# Patient Record
Sex: FEMALE | Race: WHITE | HISPANIC OR LATINO | Employment: FULL TIME | URBAN - METROPOLITAN AREA
[De-identification: names, ages, dates, MRNs, and addresses within clinical notes are randomized per-mention and may not be internally consistent; named-entity substitution may affect disease eponyms.]

---

## 2024-07-13 ENCOUNTER — OFFICE VISIT (OUTPATIENT)
Dept: URGENT CARE | Facility: CLINIC | Age: 34
End: 2024-07-13
Payer: COMMERCIAL

## 2024-07-13 VITALS
RESPIRATION RATE: 16 BRPM | WEIGHT: 183.4 LBS | OXYGEN SATURATION: 100 % | BODY MASS INDEX: 29.47 KG/M2 | SYSTOLIC BLOOD PRESSURE: 102 MMHG | HEART RATE: 86 BPM | DIASTOLIC BLOOD PRESSURE: 68 MMHG | HEIGHT: 66 IN | TEMPERATURE: 96.5 F

## 2024-07-13 DIAGNOSIS — H00.014 HORDEOLUM EXTERNUM OF LEFT UPPER EYELID: ICD-10-CM

## 2024-07-13 PROCEDURE — 99213 OFFICE O/P EST LOW 20 MIN: CPT

## 2024-07-13 PROCEDURE — 3078F DIAST BP <80 MM HG: CPT

## 2024-07-13 PROCEDURE — 3074F SYST BP LT 130 MM HG: CPT

## 2024-07-13 RX ORDER — ERYTHROMYCIN 5 MG/G
OINTMENT OPHTHALMIC
Qty: 3.5 G | Refills: 0 | Status: ON HOLD | OUTPATIENT
Start: 2024-07-13 | End: 2024-07-27

## 2024-07-13 ASSESSMENT — ENCOUNTER SYMPTOMS
EYE REDNESS: 0
FEVER: 0
COUGH: 0
EYE PAIN: 1
CHILLS: 0
BLURRED VISION: 0
EYE DISCHARGE: 0

## 2024-07-19 ENCOUNTER — HOSPITAL ENCOUNTER (EMERGENCY)
Facility: MEDICAL CENTER | Age: 34
End: 2024-07-19
Attending: OBSTETRICS & GYNECOLOGY | Admitting: OBSTETRICS & GYNECOLOGY
Payer: COMMERCIAL

## 2024-07-19 VITALS
HEART RATE: 82 BPM | SYSTOLIC BLOOD PRESSURE: 119 MMHG | HEIGHT: 66 IN | TEMPERATURE: 96.7 F | WEIGHT: 183 LBS | OXYGEN SATURATION: 94 % | BODY MASS INDEX: 29.41 KG/M2 | RESPIRATION RATE: 18 BRPM | DIASTOLIC BLOOD PRESSURE: 59 MMHG

## 2024-07-19 LAB
ALBUMIN SERPL BCP-MCNC: 3.5 G/DL (ref 3.2–4.9)
ALBUMIN/GLOB SERPL: 1.3 G/DL
ALP SERPL-CCNC: 134 U/L (ref 30–99)
ALT SERPL-CCNC: 19 U/L (ref 2–50)
ANION GAP SERPL CALC-SCNC: 15 MMOL/L (ref 7–16)
AST SERPL-CCNC: 14 U/L (ref 12–45)
BASOPHILS # BLD AUTO: 0.2 % (ref 0–1.8)
BASOPHILS # BLD: 0.02 K/UL (ref 0–0.12)
BILIRUB SERPL-MCNC: 0.3 MG/DL (ref 0.1–1.5)
BUN SERPL-MCNC: 6 MG/DL (ref 8–22)
CALCIUM ALBUM COR SERPL-MCNC: 10 MG/DL (ref 8.5–10.5)
CALCIUM SERPL-MCNC: 9.6 MG/DL (ref 8.5–10.5)
CHLORIDE SERPL-SCNC: 105 MMOL/L (ref 96–112)
CO2 SERPL-SCNC: 18 MMOL/L (ref 20–33)
CREAT SERPL-MCNC: 0.37 MG/DL (ref 0.5–1.4)
CREAT UR-MCNC: 103.9 MG/DL
EOSINOPHIL # BLD AUTO: 0.07 K/UL (ref 0–0.51)
EOSINOPHIL NFR BLD: 0.7 % (ref 0–6.9)
ERYTHROCYTE [DISTWIDTH] IN BLOOD BY AUTOMATED COUNT: 42.4 FL (ref 35.9–50)
GFR SERPLBLD CREATININE-BSD FMLA CKD-EPI: 136 ML/MIN/1.73 M 2
GLOBULIN SER CALC-MCNC: 2.7 G/DL (ref 1.9–3.5)
GLUCOSE SERPL-MCNC: 107 MG/DL (ref 65–99)
HCT VFR BLD AUTO: 39.4 % (ref 37–47)
HGB BLD-MCNC: 14.3 G/DL (ref 12–16)
IMM GRANULOCYTES # BLD AUTO: 0.05 K/UL (ref 0–0.11)
IMM GRANULOCYTES NFR BLD AUTO: 0.5 % (ref 0–0.9)
LYMPHOCYTES # BLD AUTO: 1.39 K/UL (ref 1–4.8)
LYMPHOCYTES NFR BLD: 14.1 % (ref 22–41)
MCH RBC QN AUTO: 33.6 PG (ref 27–33)
MCHC RBC AUTO-ENTMCNC: 36.3 G/DL (ref 32.2–35.5)
MCV RBC AUTO: 92.5 FL (ref 81.4–97.8)
MONOCYTES # BLD AUTO: 0.54 K/UL (ref 0–0.85)
MONOCYTES NFR BLD AUTO: 5.5 % (ref 0–13.4)
NEUTROPHILS # BLD AUTO: 7.79 K/UL (ref 1.82–7.42)
NEUTROPHILS NFR BLD: 79 % (ref 44–72)
NRBC # BLD AUTO: 0 K/UL
NRBC BLD-RTO: 0 /100 WBC (ref 0–0.2)
PLATELET # BLD AUTO: 170 K/UL (ref 164–446)
PMV BLD AUTO: 12.8 FL (ref 9–12.9)
POTASSIUM SERPL-SCNC: 3.8 MMOL/L (ref 3.6–5.5)
PROT SERPL-MCNC: 6.2 G/DL (ref 6–8.2)
PROT UR-MCNC: 12 MG/DL (ref 0–15)
PROT/CREAT UR: 115 MG/G (ref 10–107)
RBC # BLD AUTO: 4.26 M/UL (ref 4.2–5.4)
SODIUM SERPL-SCNC: 138 MMOL/L (ref 135–145)
URATE SERPL-MCNC: 4.3 MG/DL (ref 1.9–8.2)
WBC # BLD AUTO: 9.9 K/UL (ref 4.8–10.8)

## 2024-07-19 PROCEDURE — 36415 COLL VENOUS BLD VENIPUNCTURE: CPT

## 2024-07-19 PROCEDURE — 99283 EMERGENCY DEPT VISIT LOW MDM: CPT

## 2024-07-19 PROCEDURE — 82570 ASSAY OF URINE CREATININE: CPT

## 2024-07-19 PROCEDURE — 85025 COMPLETE CBC W/AUTO DIFF WBC: CPT

## 2024-07-19 PROCEDURE — 84156 ASSAY OF PROTEIN URINE: CPT

## 2024-07-19 PROCEDURE — 84550 ASSAY OF BLOOD/URIC ACID: CPT

## 2024-07-19 PROCEDURE — 80053 COMPREHEN METABOLIC PANEL: CPT

## 2024-07-19 PROCEDURE — 59025 FETAL NON-STRESS TEST: CPT

## 2024-07-25 ENCOUNTER — HOSPITAL ENCOUNTER (INPATIENT)
Facility: MEDICAL CENTER | Age: 34
LOS: 2 days | End: 2024-07-27
Attending: OBSTETRICS & GYNECOLOGY | Admitting: OBSTETRICS & GYNECOLOGY
Payer: COMMERCIAL

## 2024-07-25 DIAGNOSIS — R10.2 POSTPARTUM PERINEAL PAIN: ICD-10-CM

## 2024-07-25 LAB — HOLDING TUBE BB 8507: NORMAL

## 2024-07-25 PROCEDURE — 302449 STATCHG TRIAGE ONLY (STATISTIC)

## 2024-07-25 PROCEDURE — 85025 COMPLETE CBC W/AUTO DIFF WBC: CPT

## 2024-07-25 PROCEDURE — 36415 COLL VENOUS BLD VENIPUNCTURE: CPT

## 2024-07-25 PROCEDURE — 700111 HCHG RX REV CODE 636 W/ 250 OVERRIDE (IP): Performed by: OBSTETRICS & GYNECOLOGY

## 2024-07-25 PROCEDURE — 770002 HCHG ROOM/CARE - OB PRIVATE (112)

## 2024-07-25 PROCEDURE — 700105 HCHG RX REV CODE 258: Performed by: OBSTETRICS & GYNECOLOGY

## 2024-07-25 PROCEDURE — 86780 TREPONEMA PALLIDUM: CPT

## 2024-07-25 RX ORDER — LIDOCAINE HYDROCHLORIDE 10 MG/ML
20 INJECTION, SOLUTION INFILTRATION; PERINEURAL
Status: DISCONTINUED | OUTPATIENT
Start: 2024-07-25 | End: 2024-07-26

## 2024-07-25 RX ORDER — OXYTOCIN 10 [USP'U]/ML
10 INJECTION, SOLUTION INTRAMUSCULAR; INTRAVENOUS
Status: DISCONTINUED | OUTPATIENT
Start: 2024-07-25 | End: 2024-07-26

## 2024-07-25 RX ORDER — SODIUM CHLORIDE, SODIUM LACTATE, POTASSIUM CHLORIDE, CALCIUM CHLORIDE 600; 310; 30; 20 MG/100ML; MG/100ML; MG/100ML; MG/100ML
INJECTION, SOLUTION INTRAVENOUS CONTINUOUS
Status: DISCONTINUED | OUTPATIENT
Start: 2024-07-25 | End: 2024-07-26

## 2024-07-25 RX ORDER — ACETAMINOPHEN 500 MG
1000 TABLET ORAL
Status: COMPLETED | OUTPATIENT
Start: 2024-07-25 | End: 2024-07-26

## 2024-07-25 RX ORDER — TERBUTALINE SULFATE 1 MG/ML
0.25 INJECTION, SOLUTION SUBCUTANEOUS
Status: DISCONTINUED | OUTPATIENT
Start: 2024-07-25 | End: 2024-07-26

## 2024-07-25 RX ORDER — IBUPROFEN 800 MG/1
800 TABLET, FILM COATED ORAL
Status: COMPLETED | OUTPATIENT
Start: 2024-07-25 | End: 2024-07-26

## 2024-07-25 RX ADMIN — SODIUM CHLORIDE, POTASSIUM CHLORIDE, SODIUM LACTATE AND CALCIUM CHLORIDE: 600; 310; 30; 20 INJECTION, SOLUTION INTRAVENOUS at 23:11

## 2024-07-25 RX ADMIN — OXYTOCIN 2 MILLI-UNITS/MIN: 10 INJECTION INTRAVENOUS at 23:10

## 2024-07-25 ASSESSMENT — LIFESTYLE VARIABLES
ALCOHOL_USE: NO
EVER_SMOKED: NEVER

## 2024-07-25 ASSESSMENT — PATIENT HEALTH QUESTIONNAIRE - PHQ9
SUM OF ALL RESPONSES TO PHQ9 QUESTIONS 1 AND 2: 0
1. LITTLE INTEREST OR PLEASURE IN DOING THINGS: NOT AT ALL
2. FEELING DOWN, DEPRESSED, IRRITABLE, OR HOPELESS: NOT AT ALL

## 2024-07-25 ASSESSMENT — FIBROSIS 4 INDEX: FIB4 SCORE: 0.62

## 2024-07-25 ASSESSMENT — PAIN DESCRIPTION - PAIN TYPE
TYPE: ACUTE PAIN

## 2024-07-26 ENCOUNTER — ANESTHESIA (OUTPATIENT)
Dept: ANESTHESIOLOGY | Facility: MEDICAL CENTER | Age: 34
End: 2024-07-26
Payer: COMMERCIAL

## 2024-07-26 ENCOUNTER — ANESTHESIA EVENT (OUTPATIENT)
Dept: ANESTHESIOLOGY | Facility: MEDICAL CENTER | Age: 34
End: 2024-07-26
Payer: COMMERCIAL

## 2024-07-26 LAB
BASOPHILS # BLD AUTO: 0.3 % (ref 0–1.8)
BASOPHILS # BLD: 0.04 K/UL (ref 0–0.12)
EOSINOPHIL # BLD AUTO: 0.11 K/UL (ref 0–0.51)
EOSINOPHIL NFR BLD: 0.9 % (ref 0–6.9)
ERYTHROCYTE [DISTWIDTH] IN BLOOD BY AUTOMATED COUNT: 43.4 FL (ref 35.9–50)
HCT VFR BLD AUTO: 39.8 % (ref 37–47)
HGB BLD-MCNC: 13.9 G/DL (ref 12–16)
IMM GRANULOCYTES # BLD AUTO: 0.08 K/UL (ref 0–0.11)
IMM GRANULOCYTES NFR BLD AUTO: 0.7 % (ref 0–0.9)
LYMPHOCYTES # BLD AUTO: 2.16 K/UL (ref 1–4.8)
LYMPHOCYTES NFR BLD: 17.7 % (ref 22–41)
MCH RBC QN AUTO: 32.7 PG (ref 27–33)
MCHC RBC AUTO-ENTMCNC: 34.9 G/DL (ref 32.2–35.5)
MCV RBC AUTO: 93.6 FL (ref 81.4–97.8)
MONOCYTES # BLD AUTO: 0.84 K/UL (ref 0–0.85)
MONOCYTES NFR BLD AUTO: 6.9 % (ref 0–13.4)
NEUTROPHILS # BLD AUTO: 8.96 K/UL (ref 1.82–7.42)
NEUTROPHILS NFR BLD: 73.5 % (ref 44–72)
NRBC # BLD AUTO: 0 K/UL
NRBC BLD-RTO: 0 /100 WBC (ref 0–0.2)
PLATELET # BLD AUTO: 173 K/UL (ref 164–446)
PMV BLD AUTO: 13.3 FL (ref 9–12.9)
RBC # BLD AUTO: 4.25 M/UL (ref 4.2–5.4)
T PALLIDUM AB SER QL IA: NORMAL
WBC # BLD AUTO: 12.2 K/UL (ref 4.8–10.8)

## 2024-07-26 PROCEDURE — 10907ZC DRAINAGE OF AMNIOTIC FLUID, THERAPEUTIC FROM PRODUCTS OF CONCEPTION, VIA NATURAL OR ARTIFICIAL OPENING: ICD-10-PCS | Performed by: OBSTETRICS & GYNECOLOGY

## 2024-07-26 PROCEDURE — 3E033VJ INTRODUCTION OF OTHER HORMONE INTO PERIPHERAL VEIN, PERCUTANEOUS APPROACH: ICD-10-PCS | Performed by: OBSTETRICS & GYNECOLOGY

## 2024-07-26 PROCEDURE — 700101 HCHG RX REV CODE 250: Performed by: OBSTETRICS & GYNECOLOGY

## 2024-07-26 PROCEDURE — 700111 HCHG RX REV CODE 636 W/ 250 OVERRIDE (IP): Performed by: OBSTETRICS & GYNECOLOGY

## 2024-07-26 PROCEDURE — 700102 HCHG RX REV CODE 250 W/ 637 OVERRIDE(OP): Performed by: OBSTETRICS & GYNECOLOGY

## 2024-07-26 PROCEDURE — 303615 HCHG EPIDURAL/SPINAL ANESTHESIA FOR LABOR

## 2024-07-26 PROCEDURE — A9270 NON-COVERED ITEM OR SERVICE: HCPCS | Performed by: OBSTETRICS & GYNECOLOGY

## 2024-07-26 PROCEDURE — 770002 HCHG ROOM/CARE - OB PRIVATE (112)

## 2024-07-26 PROCEDURE — 700105 HCHG RX REV CODE 258: Performed by: OBSTETRICS & GYNECOLOGY

## 2024-07-26 PROCEDURE — 304965 HCHG RECOVERY SERVICES

## 2024-07-26 PROCEDURE — 59409 OBSTETRICAL CARE: CPT

## 2024-07-26 PROCEDURE — 0KQM0ZZ REPAIR PERINEUM MUSCLE, OPEN APPROACH: ICD-10-PCS | Performed by: OBSTETRICS & GYNECOLOGY

## 2024-07-26 PROCEDURE — 700111 HCHG RX REV CODE 636 W/ 250 OVERRIDE (IP): Mod: JZ

## 2024-07-26 RX ORDER — VITAMIN A ACETATE, BETA CAROTENE, ASCORBIC ACID, CHOLECALCIFEROL, .ALPHA.-TOCOPHEROL ACETATE, DL-, THIAMINE MONONITRATE, RIBOFLAVIN, NIACINAMIDE, PYRIDOXINE HYDROCHLORIDE, FOLIC ACID, CYANOCOBALAMIN, CALCIUM CARBONATE, FERROUS FUMARATE, ZINC OXIDE, CUPRIC OXIDE 3080; 12; 120; 400; 1; 1.84; 3; 20; 22; 920; 25; 200; 27; 10; 2 [IU]/1; UG/1; MG/1; [IU]/1; MG/1; MG/1; MG/1; MG/1; MG/1; [IU]/1; MG/1; MG/1; MG/1; MG/1; MG/1
1 TABLET, FILM COATED ORAL
Status: DISCONTINUED | OUTPATIENT
Start: 2024-07-26 | End: 2024-07-27 | Stop reason: HOSPADM

## 2024-07-26 RX ORDER — SIMETHICONE 125 MG
125 TABLET,CHEWABLE ORAL 4 TIMES DAILY PRN
Status: DISCONTINUED | OUTPATIENT
Start: 2024-07-26 | End: 2024-07-27 | Stop reason: HOSPADM

## 2024-07-26 RX ORDER — DOCUSATE SODIUM 100 MG/1
100 CAPSULE, LIQUID FILLED ORAL 2 TIMES DAILY PRN
Status: DISCONTINUED | OUTPATIENT
Start: 2024-07-26 | End: 2024-07-27 | Stop reason: HOSPADM

## 2024-07-26 RX ORDER — CALCIUM CARBONATE 500 MG/1
1000 TABLET, CHEWABLE ORAL EVERY 6 HOURS PRN
Status: DISCONTINUED | OUTPATIENT
Start: 2024-07-26 | End: 2024-07-27 | Stop reason: HOSPADM

## 2024-07-26 RX ORDER — SODIUM CHLORIDE, SODIUM LACTATE, POTASSIUM CHLORIDE, AND CALCIUM CHLORIDE .6; .31; .03; .02 G/100ML; G/100ML; G/100ML; G/100ML
1000 INJECTION, SOLUTION INTRAVENOUS
Status: DISCONTINUED | OUTPATIENT
Start: 2024-07-26 | End: 2024-07-26 | Stop reason: HOSPADM

## 2024-07-26 RX ORDER — ROPIVACAINE HYDROCHLORIDE 2 MG/ML
INJECTION, SOLUTION EPIDURAL; INFILTRATION; PERINEURAL
Status: COMPLETED
Start: 2024-07-26 | End: 2024-07-26

## 2024-07-26 RX ORDER — IBUPROFEN 800 MG/1
800 TABLET, FILM COATED ORAL EVERY 8 HOURS PRN
Status: DISCONTINUED | OUTPATIENT
Start: 2024-07-26 | End: 2024-07-27 | Stop reason: HOSPADM

## 2024-07-26 RX ORDER — ROPIVACAINE HYDROCHLORIDE 2 MG/ML
INJECTION, SOLUTION EPIDURAL; INFILTRATION; PERINEURAL CONTINUOUS
Status: DISCONTINUED | OUTPATIENT
Start: 2024-07-26 | End: 2024-07-26

## 2024-07-26 RX ORDER — LORATADINE 10 MG/1
10 TABLET ORAL ONCE
Status: COMPLETED | OUTPATIENT
Start: 2024-07-26 | End: 2024-07-26

## 2024-07-26 RX ORDER — MISOPROSTOL 200 UG/1
600 TABLET ORAL
Status: DISCONTINUED | OUTPATIENT
Start: 2024-07-26 | End: 2024-07-27 | Stop reason: HOSPADM

## 2024-07-26 RX ORDER — EPHEDRINE SULFATE 50 MG/ML
5 INJECTION, SOLUTION INTRAVENOUS
Status: DISCONTINUED | OUTPATIENT
Start: 2024-07-26 | End: 2024-07-26 | Stop reason: HOSPADM

## 2024-07-26 RX ORDER — ACETAMINOPHEN 500 MG
1000 TABLET ORAL EVERY 6 HOURS PRN
Status: DISCONTINUED | OUTPATIENT
Start: 2024-07-26 | End: 2024-07-27 | Stop reason: HOSPADM

## 2024-07-26 RX ORDER — OXYCODONE HYDROCHLORIDE 5 MG/1
5 TABLET ORAL EVERY 4 HOURS PRN
Status: DISCONTINUED | OUTPATIENT
Start: 2024-07-26 | End: 2024-07-27 | Stop reason: HOSPADM

## 2024-07-26 RX ORDER — SODIUM CHLORIDE, SODIUM LACTATE, POTASSIUM CHLORIDE, AND CALCIUM CHLORIDE .6; .31; .03; .02 G/100ML; G/100ML; G/100ML; G/100ML
250 INJECTION, SOLUTION INTRAVENOUS PRN
Status: DISCONTINUED | OUTPATIENT
Start: 2024-07-26 | End: 2024-07-26 | Stop reason: HOSPADM

## 2024-07-26 RX ORDER — SODIUM CHLORIDE, SODIUM LACTATE, POTASSIUM CHLORIDE, CALCIUM CHLORIDE 600; 310; 30; 20 MG/100ML; MG/100ML; MG/100ML; MG/100ML
INJECTION, SOLUTION INTRAVENOUS PRN
Status: DISCONTINUED | OUTPATIENT
Start: 2024-07-26 | End: 2024-07-27 | Stop reason: HOSPADM

## 2024-07-26 RX ADMIN — ROPIVACAINE HYDROCHLORIDE 200 MG: 2 INJECTION, SOLUTION EPIDURAL; INFILTRATION at 02:29

## 2024-07-26 RX ADMIN — OXYCODONE 5 MG: 5 TABLET ORAL at 14:45

## 2024-07-26 RX ADMIN — OXYTOCIN 20 UNITS: 10 INJECTION INTRAVENOUS at 11:15

## 2024-07-26 RX ADMIN — LIDOCAINE HYDROCHLORIDE 20 ML: 10 INJECTION, SOLUTION INFILTRATION; PERINEURAL at 10:55

## 2024-07-26 RX ADMIN — ACETAMINOPHEN 1000 MG: 500 TABLET ORAL at 11:51

## 2024-07-26 RX ADMIN — ACETAMINOPHEN 1000 MG: 500 TABLET ORAL at 18:22

## 2024-07-26 RX ADMIN — LORATADINE 10 MG: 10 TABLET ORAL at 02:51

## 2024-07-26 RX ADMIN — IBUPROFEN 800 MG: 800 TABLET, FILM COATED ORAL at 11:51

## 2024-07-26 RX ADMIN — OXYTOCIN 125 ML/HR: 10 INJECTION INTRAVENOUS at 12:32

## 2024-07-26 RX ADMIN — OXYCODONE 5 MG: 5 TABLET ORAL at 21:58

## 2024-07-26 RX ADMIN — ROPIVACAINE HYDROCHLORIDE 200 MG: 2 INJECTION, SOLUTION EPIDURAL; INFILTRATION; PERINEURAL at 02:29

## 2024-07-26 ASSESSMENT — PAIN SCALES - GENERAL: PAIN_LEVEL: 0

## 2024-07-26 ASSESSMENT — PAIN DESCRIPTION - PAIN TYPE
TYPE: ACUTE PAIN

## 2024-07-26 NOTE — PROGRESS NOTES
Patient presenting to labor and delivery. Admission profile completed. Patient denies VB, LOF, HA, vision changes, pain. Patient reports active fetal movement and occasional mild contractions. Patient reports a history of elevated blood pressures. Discussed plan of care with patient and significant other. IV started and labs collected. Oriented patient to unit, discussed how to use bed controls and call light. Dr. Rivera at bedside to assess patient. Pitocin started per MD orders see MAR.     0212: Dr. Palmer at bedside to insert epidural catheter. Consent provided to patient and consent papers are signed. Patient positioned upright at edge of bed with assistance from RN and anesthesiologist. Timeout completed all members agree, Test dose performed and resulted as negative.    0330: Report given to Maddy BRENNER, POC discussed, care of patient relinquished at this time.

## 2024-07-26 NOTE — PROGRESS NOTES
"Labor Progress Note    Hannah Vieira   38w2d      Subjective:  Uterine contractions:yes  Pain: No    Objective:   Vitals:    07/25/24 2110 07/25/24 2120   BP: 128/79    Pulse: 75 78   Resp: 18    Temp: 36.3 °C (97.4 °F)    TempSrc: Temporal    SpO2: 96%    Weight: 83.9 kg (185 lb)    Height: 1.676 m (5' 6\")      Fetal heart variability: moderate  Fetal Heart Rate decelerations: none  Fetal Heart Rate accelerations: yes  Baseline FHR: 130 per minute  Uterine contractions: regular, every 2 minutes  Cervical: 75% ;  Dilatation .4 ; Station negative1    Membranes ruptured: .yes and arom clear    Meds:   Epidural : .no  Pitocin: .yes    Labs:  Recent Results (from the past 24 hour(s))   HOLD BLOOD BANK SPECIMEN (NOT TESTED)    Collection Time: 07/25/24  9:20 PM   Result Value Ref Range    Holding Tube - Bb DONE        Assessment:   38w2d  Labor State: Early latent labor.      Wendi Rivera M.D.            "

## 2024-07-26 NOTE — PROGRESS NOTES
0510: Report received from Maddy BRENNER. POC discussed.     0815: SVE complete. Dr Ramos notified.     0835: Began pushing.     1045: Delivery of viable female infant.     1245: Pt up to bathroom, steady gait, brian care done.     1445: Pt reporting pain in rectum, unable to sit or find a comfortable position. Visualized by this RN, small potential hemorrhoid vs hematoma noted.     1500: Dr Ramos at bedside to assess pt rectum. Plan to recheck area in one hour. Tucks and spray in place, ice packs being used.     1555: This RN at bedside to assess, less swelling noted to rectum. Pt reports improvement in pain and comfort in rectal area. Pt into wheelchair and moved to postpartum. Report to Terrie BRENNER. POC discussed.

## 2024-07-26 NOTE — PROGRESS NOTES
Labor Progress Note    Hannah Vieira   38w2d      Subjective:  Comfortable with epidural but breathing through the peak controlled.     Objective:   Vitals:    24 0359 24 0419 24 0439 24 0459   BP: 104/66 108/70 110/66 103/60   Pulse: 65 68 (!) 59 62   Resp:       Temp:       TempSrc:       SpO2:       Weight:       Height:         Fetal tracing: category 2, moderate variability, some early decels/variables present. No late appearing decelerations  SVE: complete per RN check  [place] q2 min but some lasting 2.5-3 min long        Labs:  Recent Results (from the past 24 hour(s))   HOLD BLOOD BANK SPECIMEN (NOT TESTED)    Collection Time: 24  9:20 PM   Result Value Ref Range    Holding Tube - Bb DONE    CBC with differential    Collection Time: 24  9:20 PM   Result Value Ref Range    WBC 12.2 (H) 4.8 - 10.8 K/uL    RBC 4.25 4.20 - 5.40 M/uL    Hemoglobin 13.9 12.0 - 16.0 g/dL    Hematocrit 39.8 37.0 - 47.0 %    MCV 93.6 81.4 - 97.8 fL    MCH 32.7 27.0 - 33.0 pg    MCHC 34.9 32.2 - 35.5 g/dL    RDW 43.4 35.9 - 50.0 fL    Platelet Count 173 164 - 446 K/uL    MPV 13.3 (H) 9.0 - 12.9 fL    Neutrophils-Polys 73.50 (H) 44.00 - 72.00 %    Lymphocytes 17.70 (L) 22.00 - 41.00 %    Monocytes 6.90 0.00 - 13.40 %    Eosinophils 0.90 0.00 - 6.90 %    Basophils 0.30 0.00 - 1.80 %    Immature Granulocytes 0.70 0.00 - 0.90 %    Nucleated RBC 0.00 0.00 - 0.20 /100 WBC    Neutrophils (Absolute) 8.96 (H) 1.82 - 7.42 K/uL    Lymphs (Absolute) 2.16 1.00 - 4.80 K/uL    Monos (Absolute) 0.84 0.00 - 0.85 K/uL    Eos (Absolute) 0.11 0.00 - 0.51 K/uL    Baso (Absolute) 0.04 0.00 - 0.12 K/uL    Immature Granulocytes (abs) 0.08 0.00 - 0.11 K/uL    NRBC (Absolute) 0.00 K/uL   T.PALLIDUM AB ROSEMARY (Syphilis)    Collection Time: 24  9:20 PM   Result Value Ref Range    Syphilis, Treponemal Qual Non-Reactive Non-Reactive       Assessment: plan  34 y.o. female  at 38w2d   Pt to start pushing  Fetal tracing  overall reassuring  Anticipate         Ronel Ramos M.D.

## 2024-07-26 NOTE — PROGRESS NOTES
See dictation for full note     with mild shoulder dystocia apx 10 seconds (Mostly a lack of maternal pushing dystocia)  Head of bed flattened and Phylicia and Ridkin maneuver for delivery.   Female  Vtx  Clear fluid  Tight nuchal x 1  Apgars 7/8  Weight pending at this time  Intact placenta , 3vc  2nd degree laceration repaired with 2.0 and 3.0 vicryl  Rectum and spincter intact  EBL 200cc

## 2024-07-26 NOTE — CARE PLAN
The patient is Stable - Low risk of patient condition declining or worsening    Shift Goals  Clinical Goals: cervical dilation and effacement  Patient Goals: healthy mom and healthy baby  Family Goals: support    Progress made toward(s) clinical / shift goals:    Problem: Knowledge Deficit - L&D  Goal: Patient and family/caregivers will demonstrate understanding of plan of care, disease process/condition, diagnostic tests and medications  Outcome: Progressing  Note: Oriented patient to unit, discussed bed controls and call light. Educated patient on labor and delivery process. Discussed POC with patient and support persons, patient agrees to and verbalized understanding of POC. All questions answered at this time.      Problem: Pain - Standard  Goal: Alleviation of pain or a reduction in pain to the patient’s comfort goal  Outcome: Progressing  Note: Pt states she is experiencing adequate pain control       Patient is not progressing towards the following goals:

## 2024-07-26 NOTE — L&D DELIVERY NOTE
DATE OF SERVICE:  2024     PREOPERATIVE DIAGNOSES:  1.  Intrauterine pregnancy at 38 and  weeks' gestation.  2.  Chronic hypertension with superimposed preeclampsia but currently on no   medications and not requiring magnesium sulfate therapy for seizure   prophylaxis as blood pressures are low.     POSTOPERATIVE DIAGNOSES:  1.  Intrauterine pregnancy at 38 and  weeks' gestation.  2.  Chronic hypertension with superimposed preeclampsia but currently on no   medications and not requiring magnesium sulfate therapy for seizure   prophylaxis as blood pressures are low.  3.  Mild shoulder dystocia.     PROCEDURE:  Normal spontaneous vaginal delivery with mild shoulder dystocia   approximately 10 seconds of the head on the perineum, required bed to be laid   flat, Phylicia position and Ritgen's maneuver of the fetal head as the   patient essentially stopped pushing.  The dystocia was mostly because the   patient stopped maternal expulsion efforts of pushing.     DELIVERING PHYSICIAN:  Ronel Ramos MD     ANESTHESIOLOGIST:  Kiran Palmer MD     ANESTHESIA:  Epidural.     COMPLICATIONS:  Again, about a 10-second shoulder dystocia that was alleviated   with minimal maneuvers.  The bed was laid back Phylicia position and the   fetal head was Ritgen out as maternal pushing efforts had stalled.     ESTIMATED BLOOD LOSS:  200 mL.     FINDINGS:  Female infant, cephalic presentation, clear amniotic fluid, nuchal   cord tight x1, Apgars 7 and 8, weight still pending at the time of this   dictation, second-degree midline vaginal and perineal laceration repaired with   Vicryl.  Rectum and sphincter intact.  Sponge, laps and needle counts were   correct.  Mother and baby both doing well.  Mother will go to postpartum, baby   to  nursery.  Baby is moving all extremities.     DESCRIPTION OF PROCEDURE/HOSPITAL COURSE:  The patient was admitted for   induction of labor.  A 34-year-old  at 38 and   weeks' gestation.    She was followed for gestational hypertension and declined induction of labor   and ultimately she progressed to having superimposed preeclampsia due to   elevated urine protein creatinine ratio in the office.  However, she was not   on any blood pressure medication and blood pressures were mild at most and   most of them were normal.  She was not initiated therefore on magnesium   sulfate.  She was admitted and begun on Pitocin.  She eventually had   artificial rupture of membranes and received epidural anesthesia.  She went to   complete.  She pushed and right at  she stopped pushing and therefore   there was a mild shoulder dystocia only secondary to that.  Head of the bed   was placed down and Phylicia position was assumed and Ritgen's maneuver was   used to deliver the fetal head and shoulders in less than 10 seconds.  There   was a tight nuchal cord that was reduced after delivery and the baby was then   placed on maternal abdomen with awaiting delivery team nurse.  Delay for cord   clamp, cord was then clamped x2 and cut by the patient's friend.  The placenta   delivered spontaneously intact, 3-vessel cord.  She had a second-degree   vaginal and perineal laceration that was repaired with 2-0 and 3-0 Vicryl   respectively.  Rectum and sphincter intact.  Sponge, laps and needle counts   were correct.  Mother and baby both doing well.  Baby is moving all arms and   extremities.        ______________________________  MD IRMA BOSS/ALONSO    DD:  2024 12:02  DT:  2024 12:31    Job#:  [id number]

## 2024-07-26 NOTE — H&P
History and Physical      Hannah Vieira is a 34 y.o. female  at 38w1d who presents for IOL  Patient was being followed in the office for gestational hypertension and had declined induction of labor.  Ultimately her disease process progressed to preeclampsia, she had a elevated urine protein creatinine ratio in the office, remainder of labs are normal.  She denies any headaches or spots in her vision.    Subjective:   negative  For CTXS.   negative Feels pain   negative for LOF  negative for vaginal bleeding.   positive for fetal movement    ROS: Pertinent items are noted in HPI.    History reviewed. No pertinent past medical history.  Past Surgical History:   Procedure Laterality Date    DENTAL EXTRACTION(S)      TONSILLECTOMY       History reviewed. No pertinent family history.  OB History    Para Term  AB Living   1             SAB IAB Ectopic Molar Multiple Live Births                    # Outcome Date GA Lbr Yves/2nd Weight Sex Delivery Anes PTL Lv   1 Current              Social History     Tobacco Use    Smoking status: Never    Smokeless tobacco: Never   Vaping Use    Vaping status: Never Used   Substance Use Topics    Alcohol use: Not Currently    Drug use: Not Currently     Allergies: Pcn [penicillins]    Current Facility-Administered Medications:     LR infusion, , Intravenous, Continuous, Wendi Rivera M.D., Last Rate: 125 mL/hr at 24 2311, New Bag at 24 2311    lidocaine (XYLOCAINE) 1%  injection, 20 mL, Subcutaneous, Once PRN, Wendi Rivera M.D.    terbutaline (Brethine) injection 0.25 mg, 0.25 mg, Subcutaneous, Once PRN, Wendi Rivera M.D.    oxytocin (Pitocin) infusion bolus (for post delivery), 20 Units, Intravenous, Once **FOLLOWED BY** oxytocin (Pitocin) infusion (for post delivery), 125 mL/hr, Intravenous, Continuous, Wendi Rivera M.D.    oxytocin (Pitocin) injection 10 Units, 10 Units, Intramuscular, Once PRN, Wendi Rivera M.D.     "ibuprofen (Motrin) tablet 800 mg, 800 mg, Oral, Once PRN, Wendi Rivera M.D.    acetaminophen (Tylenol) tablet 1,000 mg, 1,000 mg, Oral, Once PRN, Wendi Rivrea M.D.    oxytocin (Pitocin) infusion (for induction), 0.5-20 trent-units/min, Intravenous, Continuous, Wendi Rivera M.D., Last Rate: 6 mL/hr at 07/25/24 2310, 2 trent-units/min at 07/25/24 2310    Prenatal care with Working starting at 9 weeks with following problems:  Patient Active Problem List    Diagnosis Date Noted    Indication for care in labor or delivery 07/25/2024       Objective:      /79   Pulse 78   Temp 36.3 °C (97.4 °F) (Temporal)   Resp 18   Ht 1.676 m (5' 6\")   Wt 83.9 kg (185 lb)   SpO2 96%     General:   no acute distress   Skin:   normal   HEENT:  Neck supple with midline trachea   Lungs:   CTA bilateral   Heart:   S1, S2 normal, no murmur, click, rub or gallop, regular rate and rhythm, no hepatosplenomegaly, S1, S2 normal,\"no JVD   Abdomen:   gravid, NT   EFW:  7 lbs   Pelvis:  adequate with gynecoid pelvis   FHT:  150 BPM   Uterine Size: S=D   Presentations: Cephalic   Cervix:     Dilation: 3cm    Effacement: 50%    Station:  -2    Consistency: Medium    Position: Middle     Lab Review  Recent Results (from the past 5880 hour(s))   PROTEIN/CREAT RATIO URINE    Collection Time: 07/19/24  3:00 PM   Result Value Ref Range    Total Protein, Urine 12.0 0.0 - 15.0 mg/dL    Creatinine, Random Urine 103.90 mg/dL    Protein Creatinine Ratio 115 (H) 10 - 107 mg/g   CBC WITH DIFFERENTIAL    Collection Time: 07/19/24  3:07 PM   Result Value Ref Range    WBC 9.9 4.8 - 10.8 K/uL    RBC 4.26 4.20 - 5.40 M/uL    Hemoglobin 14.3 12.0 - 16.0 g/dL    Hematocrit 39.4 37.0 - 47.0 %    MCV 92.5 81.4 - 97.8 fL    MCH 33.6 (H) 27.0 - 33.0 pg    MCHC 36.3 (H) 32.2 - 35.5 g/dL    RDW 42.4 35.9 - 50.0 fL    Platelet Count 170 164 - 446 K/uL    MPV 12.8 9.0 - 12.9 fL    Neutrophils-Polys 79.00 (H) 44.00 - 72.00 %    Lymphocytes 14.10 (L) " 22.00 - 41.00 %    Monocytes 5.50 0.00 - 13.40 %    Eosinophils 0.70 0.00 - 6.90 %    Basophils 0.20 0.00 - 1.80 %    Immature Granulocytes 0.50 0.00 - 0.90 %    Nucleated RBC 0.00 0.00 - 0.20 /100 WBC    Neutrophils (Absolute) 7.79 (H) 1.82 - 7.42 K/uL    Lymphs (Absolute) 1.39 1.00 - 4.80 K/uL    Monos (Absolute) 0.54 0.00 - 0.85 K/uL    Eos (Absolute) 0.07 0.00 - 0.51 K/uL    Baso (Absolute) 0.02 0.00 - 0.12 K/uL    Immature Granulocytes (abs) 0.05 0.00 - 0.11 K/uL    NRBC (Absolute) 0.00 K/uL   Comp Metabolic Panel    Collection Time: 07/19/24  3:07 PM   Result Value Ref Range    Sodium 138 135 - 145 mmol/L    Potassium 3.8 3.6 - 5.5 mmol/L    Chloride 105 96 - 112 mmol/L    Co2 18 (L) 20 - 33 mmol/L    Anion Gap 15.0 7.0 - 16.0    Glucose 107 (H) 65 - 99 mg/dL    Bun 6 (L) 8 - 22 mg/dL    Creatinine 0.37 (L) 0.50 - 1.40 mg/dL    Calcium 9.6 8.5 - 10.5 mg/dL    Correct Calcium 10.0 8.5 - 10.5 mg/dL    AST(SGOT) 14 12 - 45 U/L    ALT(SGPT) 19 2 - 50 U/L    Alkaline Phosphatase 134 (H) 30 - 99 U/L    Total Bilirubin 0.3 0.1 - 1.5 mg/dL    Albumin 3.5 3.2 - 4.9 g/dL    Total Protein 6.2 6.0 - 8.2 g/dL    Globulin 2.7 1.9 - 3.5 g/dL    A-G Ratio 1.3 g/dL   URIC ACID    Collection Time: 07/19/24  3:07 PM   Result Value Ref Range    Uric Acid 4.3 1.9 - 8.2 mg/dL   ESTIMATED GFR    Collection Time: 07/19/24  3:07 PM   Result Value Ref Range    GFR (CKD-EPI) 136 >60 mL/min/1.73 m 2   HOLD BLOOD BANK SPECIMEN (NOT TESTED)    Collection Time: 07/25/24  9:20 PM   Result Value Ref Range    Holding Tube - Bb DONE         Assessment:   Hannah Vieira at 38w1d  Labor status: Not in labor.  Obstetrical history significant for   Patient Active Problem List    Diagnosis Date Noted    Indication for care in labor or delivery 07/25/2024   .      Plan:     Admit to L&D  GBS negative  Begin pitocin

## 2024-07-27 ENCOUNTER — PHARMACY VISIT (OUTPATIENT)
Dept: PHARMACY | Facility: MEDICAL CENTER | Age: 34
End: 2024-07-27
Payer: COMMERCIAL

## 2024-07-27 VITALS
HEIGHT: 66 IN | WEIGHT: 185 LBS | TEMPERATURE: 98 F | HEART RATE: 68 BPM | BODY MASS INDEX: 29.73 KG/M2 | SYSTOLIC BLOOD PRESSURE: 110 MMHG | RESPIRATION RATE: 16 BRPM | OXYGEN SATURATION: 98 % | DIASTOLIC BLOOD PRESSURE: 68 MMHG

## 2024-07-27 LAB
ERYTHROCYTE [DISTWIDTH] IN BLOOD BY AUTOMATED COUNT: 45.9 FL (ref 35.9–50)
HCT VFR BLD AUTO: 32.9 % (ref 37–47)
HGB BLD-MCNC: 11.5 G/DL (ref 12–16)
MCH RBC QN AUTO: 34.1 PG (ref 27–33)
MCHC RBC AUTO-ENTMCNC: 35 G/DL (ref 32.2–35.5)
MCV RBC AUTO: 97.6 FL (ref 81.4–97.8)
PLATELET # BLD AUTO: 132 K/UL (ref 164–446)
PMV BLD AUTO: 13 FL (ref 9–12.9)
RBC # BLD AUTO: 3.37 M/UL (ref 4.2–5.4)
WBC # BLD AUTO: 12.6 K/UL (ref 4.8–10.8)

## 2024-07-27 PROCEDURE — A9270 NON-COVERED ITEM OR SERVICE: HCPCS | Performed by: OBSTETRICS & GYNECOLOGY

## 2024-07-27 PROCEDURE — 85027 COMPLETE CBC AUTOMATED: CPT

## 2024-07-27 PROCEDURE — 700102 HCHG RX REV CODE 250 W/ 637 OVERRIDE(OP): Performed by: OBSTETRICS & GYNECOLOGY

## 2024-07-27 PROCEDURE — 36415 COLL VENOUS BLD VENIPUNCTURE: CPT

## 2024-07-27 PROCEDURE — RXMED WILLOW AMBULATORY MEDICATION CHARGE: Performed by: OBSTETRICS & GYNECOLOGY

## 2024-07-27 RX ORDER — PSEUDOEPHEDRINE HCL 30 MG
100 TABLET ORAL 2 TIMES DAILY PRN
Qty: 60 CAPSULE | Refills: 0 | Status: SHIPPED | OUTPATIENT
Start: 2024-07-27

## 2024-07-27 RX ORDER — OXYCODONE HYDROCHLORIDE 5 MG/1
5 TABLET ORAL EVERY 4 HOURS PRN
Qty: 12 TABLET | Refills: 0 | Status: SHIPPED | OUTPATIENT
Start: 2024-07-27 | End: 2024-08-01

## 2024-07-27 RX ORDER — IBUPROFEN 800 MG/1
800 TABLET, FILM COATED ORAL EVERY 8 HOURS PRN
Qty: 30 TABLET | Refills: 1 | Status: SHIPPED | OUTPATIENT
Start: 2024-07-27

## 2024-07-27 RX ADMIN — OXYCODONE 5 MG: 5 TABLET ORAL at 08:05

## 2024-07-27 RX ADMIN — PRENATAL WITH FERROUS FUM AND FOLIC ACID 1 TABLET: 3080; 920; 120; 400; 22; 1.84; 3; 20; 10; 1; 12; 200; 27; 25; 2 TABLET ORAL at 08:00

## 2024-07-27 RX ADMIN — OXYCODONE 5 MG: 5 TABLET ORAL at 14:23

## 2024-07-27 RX ADMIN — IBUPROFEN 800 MG: 800 TABLET, FILM COATED ORAL at 00:46

## 2024-07-27 RX ADMIN — DOCUSATE SODIUM 100 MG: 100 CAPSULE, LIQUID FILLED ORAL at 08:05

## 2024-07-27 RX ADMIN — IBUPROFEN 800 MG: 800 TABLET, FILM COATED ORAL at 08:05

## 2024-07-27 RX ADMIN — ACETAMINOPHEN 1000 MG: 500 TABLET ORAL at 14:23

## 2024-07-27 ASSESSMENT — PAIN DESCRIPTION - PAIN TYPE
TYPE: ACUTE PAIN

## 2024-07-27 NOTE — CARE PLAN
The patient is Stable - Low risk of patient condition declining or worsening    Shift Goals  Clinical Goals: clinically stable  Patient Goals: healthy mom and healthy baby  Family Goals: support    Progress made toward(s) clinical / shift goals:  Patient just came up to the floor and is being monitored. She is not showing any signs of infection. She is showing effective bonding and parenting behavior.     Patient is not progressing towards the following goals:

## 2024-07-27 NOTE — PROGRESS NOTES
Progress Note    Hannah Susannah Dariel   PP day 1  Chief Admitting Dx: Pregnancy [Z34.90]  Indication for care in labor or delivery [O75.9]  Delivery Type: vaginal, spontaneous      Subjective:  Ambulating: voiding, ,tolerating diet, normal lochia, Perineum improving pain but still very sore and swollen.     Vitals:    24 1628 24 1747 24 2200 24 0525   BP: 118/71 107/66 111/70 110/68   Pulse:  85 78 68   Resp: 18 16 16 16   Temp: 37 °C (98.6 °F) 36.4 °C (97.5 °F) 37.1 °C (98.7 °F) 36.7 °C (98 °F)   TempSrc: Temporal Temporal Temporal Temporal   SpO2: 95% 95% 95% 98%   Weight:       Height:           Exam:  Gen: no acute distress  Abdomen: soft nt/nd firm fundus  Ext: no calf pain brendan LE  Perineum: sore/bruised/swollen but no evidence of hematoma    Labs:   Recent Results (from the past 24 hour(s))   CBC without differential- Once in AM regardless of delivery time    Collection Time: 24  6:54 AM   Result Value Ref Range    WBC 12.6 (H) 4.8 - 10.8 K/uL    RBC 3.37 (L) 4.20 - 5.40 M/uL    Hemoglobin 11.5 (L) 12.0 - 16.0 g/dL    Hematocrit 32.9 (L) 37.0 - 47.0 %    MCV 97.6 81.4 - 97.8 fL    MCH 34.1 (H) 27.0 - 33.0 pg    MCHC 35.0 32.2 - 35.5 g/dL    RDW 45.9 35.9 - 50.0 fL    Platelet Count 132 (L) 164 - 446 K/uL    MPV 13.0 (H) 9.0 - 12.9 fL       Assessment:  Ppd 1 s/p     Plan:  Pre-eclampsia w/out severe features for her iol; sp    Bp's normal and not requiring any medications  Discharge home  Encourage ambulation  Pelvic rest, no heavy lifting/pulling /pushing  F/u in my office  6 weeks postpartum  Continue prenatal vitamins daily  Give Rhogam if Rh negative and indicated  Give MMR if indcated prior to discharge  Encourage breastfeeding       Ronel Ramos M.D.

## 2024-07-27 NOTE — CARE PLAN
The patient is Stable - Low risk of patient condition declining or worsening    Shift Goals  Clinical Goals: Pain management, normal lochia  Patient Goals: Brian care  Family Goals: Discharge home    Progress made toward(s) clinical / shift goals:  Patient reports adequate pain management with current medications and brian care. Lochia is scant rubra. Reviewed home care for pain management, brian care and lochia changes and when to notify MD.    Patient is not progressing towards the following goals:

## 2024-07-27 NOTE — DISCHARGE INSTRUCTIONS
Breastfeeding Plan:       Breastfeed baby on demand based on early feeding cues at least 8x in 24hrs. It is not recommended to let baby sleep longer than 4 hours between feedings and if sleepy, place skin to skin to promote feeding interest and milk production.  Baby's usually feed more frequently and longer when skin to skin with mother. Continue to hand express prior to latch.       Expect cluster feeding as this is normal during early days of life and growth spurts. Baby may feed more frequently and for longer periods of time.  Its normal for a baby to want to feed up to 18x in24 hr period. This frequent feeding is activating hormones to make mature breastmilk.  Breastmilk should increase in volume by postpartum day 4.  Signs and symptoms of engorgement might occur around that time.     It is not recommended to use pacifiers or supplementing with formula until breast feeding and milk production is well established or at least 4 weeks.    Make sure infant is getting enough by ensuring frequent feedings as well as looking for transitioning stools from dark meconium to bright yellow/green seedy loose stools by day 5.     Follow up with PEDS PCP as scheduled for weight checks and to make sure feeding is progressing appropriately.    Engorgement care: frequent breastfeeding, gentle (like petting a cat) massage towards axilla, cool compresses, hand express to comfort and to soften nipple area for latch, NSAIDs (like ibuprofen) as prescribed by OB for postpartum pain relief. Contact your OB provider if you develop a hard, warm, reddened lump especially if you also have a fever, chills, aches as this is concerning for mastitis.      Create a comfortable and relaxing environment for breastfeeding, minimizing distractions and ensuring proper positioning for mom and baby. Hold baby skin to skin with mom or dad as much as possible when you are awake and alert, skin to skin promotes bonding and breastfeeding success.       Breastfeeding support is available!      [facility] holds a free Breastfeeding [place] every Thursday from 11am-12pm lead by a Lactation Consultant.  No appointment necessary.  Excludes holidays. Bring your baby!  Location: [facility] in [facility]  3rd floor conference room. PATIENT DISCHARGE EDUCATION INSTRUCTION SHEET    REASONS TO CALL YOUR OBSTETRICIAN  Persistent fever, shaking, chills (Temperature higher than 100.4) may indicate you have an infection  Heavy bleeding: soaking more than 1 pad per hour; Passing clots an egg-sized clot or bigger may mean you have an postpartum hemorrhage  Foul odor from vagina or bad smelling or discolored discharge or blood  Breast infection (Mastitis symptoms); breast pain, chills, fever, redness or red streaks, may feel flu like symptoms  Urinary pain, burning or frequency  Incision that is not healing, increased redness, swelling, tenderness or pain, or any pus from episiotomy or  site may mean you have an infection  Redness, swelling, warmth, or painful to touch in the calf area of your leg may mean you have a blood clot  Severe or intensified depression, thoughts or feelings of wanting to hurt yourself or someone else   Pain in chest, obstructed breathing or shortness of breath (trouble catching your breath) may mean you are having a postpartum complication. Call your provider immediately   Headache that does not get better, even after taking medicine, a bad headache with vision changes or pain in the upper right area of your belly may mean you have high blood pressure or post birth preeclampsia. Call your provider immediately    HAND WASHING  All family and friends should wash their hands:  Before and after holding the baby  Before feeding the baby  After using the restroom or changing the baby's diaper    WOUND CARE  Ask your physician for additional care instructions. In general:   Incision:  May shower and pat incision dry   Keep the incision  clean and dry  There should not be any opening or pus from the incision  Continue to walk at home 3 times a day   Do NOT lift anything heavier than your baby (over 10 pounds)  Encourage family to help participate in care of the  to allow rest and mom time to heal  Episiotomy/Laceration  May use brian-spray bottle, witch hazel pads and dermaplast spray for comfort  Use brian-spray bottle after urinating to cleanse perineal area  To prevent burning during urination spray brian-water bottle on labial area   Pat perineal area dry until episiotomy/laceration is healed  Continue to use brian-bottle until bleeding stops as needed  If have a 2nd degree laceration or greater, a Sitz bath can offer relief from soreness, burning, and inflammation   Sitz Bath   Sit in 6 inches of warm water and soak laceration as needed until the laceration heals    VAGINAL CARE AND BLEEDING  Nothing inside vagina for 6 weeks:   No sexual intercourse, tampons or douching  Bleeding may continue for 2-4 weeks. Amount and color may vary  Soaking 1 pad or more in an hour for several hours is considered heavy bleeding  Passing large egg sized blood clots can be concerning  If you feel like you have heavy bleeding or are having increasing amount of blood clots call your Obstetrician immediately  If you begin feeling faint upon standing, feeling sick to your stomach, have clammy skin, a really fast heartbeat, have chills, start feeling confused, dizzy, sleepy or weak, or feeling like you're going to faint call your Obstetrician immediately    HYPERTENSION   Preeclampsia or gestational hypertension are types of high blood pressure that only pregnant women can get. It is important for you to be aware of symptoms to seek early intervention and treatment. If you have any of these symptoms immediately call your Obstetrician    Vision changes or blurred vision   Severe headache or pain that is unrelieved with medication and will not go away  Persistent  "pain in upper abdomen or shoulder   Increased swelling of face, feet, or hands  Difficulty breathing or shortness of breath at rest  Urinating less than usual    URINATION AND BOWEL MOVEMENTS  Eating more fiber (bran cereal, fruits, and vegetables) and drinking plenty of fluids will help to avoid constipation  Urinary frequency and urgency after childbirth is normal  If you experience any urinary pain, burning or frequency call your provider    BIRTH CONTROL  It is possible to become pregnant at any time after delivery and while breastfeeding  Plan to discuss a method of birth control with your physician at your post delivery follow up visit    POSTPARTUM BLUES  During the first few days after birth, you may experience a sense of the \"blues\" which may include impatience, irritability or even crying. These feelings come and go quickly. However, as many as 1 in 10 women experience emotional symptoms known as postpartum depression.     POSTPARTUM DEPRESSION    May start as early as the second or third day after delivery or take several weeks or months to develop. Symptoms of \"blues\" are present, but are more intense: Crying spells; loss of appetite; feelings of hopelessness or loss of control; fear of touching the baby; over concern or no concern at all about the baby; little or no concern about your own appearance/caring for yourself; and/or inability to sleep or excessive sleeping. Contact your Obstetrician if you are experiencing any of these symptoms     PREVENTING SHAKEN BABY  If you are angry or stressed, PUT THE BABY IN THE CRIB, step away, take some deep breaths, and wait until you are calm to care for the baby. DO NOT SHAKE THE BABY. You are not alone, call a supporter for help.  [facility] 24/7 crisis call line (118-671-3839) or (1-502.246.6195)  You can also text them, text \"ANSWER\" (798398)  "

## 2024-07-27 NOTE — PROGRESS NOTES
1908: Received bedside report from day shift RNTerrie. Greeted pt and SO at the bedside. Whiteboard updated.     2052: Completed assessment. Pt complains of pain at this time. Pain medication was administered. IV in place, no signs of phlebitis or infiltration. Pitocin running at 100 mL/hr. Pt states that her cramping was painful. Lochia scant. POC discussed: pain control, lochia, hydration, and ambulation. Reinforced education on emergency and non-emergency call light system, and bed safety.    Educated pt to press the red emergency button on the side rails if she experiences:  Sudden dizziness  Gushing of blood  Soaking a pad front and back within an hour  Passing a clot bigger than an egg size  Infant cyanotic    Pt verbalized understanding. Call light placed within reach.     Educated pt about the importance of pain control and decreasing inflammation. Pt states that she would like to use the sitz bath in the morning. Tucks, spray, and ice pads brought to the bedside.

## 2024-07-27 NOTE — PROGRESS NOTES
Received report and assumed care of patient. Vital signs stable. Patient and her  oriented to room, safe sleep, emergency push buttons, and what to expect. Was notified and shown the hematoma that the patient has in her rectum area, was notified that the provider wants us to monitor it for any changed. Was told that it looks better, the same size as earlier. Was notified that patient has been requesting that her oxytocin is turned off when she is feeling sick, due to the medication; which has been occurring since she has starting using the medication. The patient came up with almost a full bag and stated that she will let me start it later at a slower rate for her. Patient was requesting a shower, so after the shower, the oxytocin may be restarted.

## 2024-07-27 NOTE — LACTATION NOTE
This note was copied from a baby's chart.  Initial Consult:     History:   MOB, Hannah, is a 35 y/o  s/p  at 38+2 wks on 2024 at 1045. CHTN with superimposed mild pre-eclampsia, no magnesium sulfate required. LENCHO - CPAP. 10 second shoulder dystocia. Tight nuchal cord.     Baby girl had BW 3450 g (7 lbs, 9.7 oz). Loss of 2.17 % at last wt. Heart murmur.      History of BF:  Primip.      Report of Current Breastfeeding Status:  Hannah reports baby has been sleepy the last 24h, but has been feeding better since 0500 today. Sustaining feeds for 15-30 mins. She is breastfeeding baby skin to skin in cross-cradle at left breast with My Breast Friend pillow at time of visit. Breasts appear soft/round/symmetrical. Nipples everted/intact/pliable/sore.     She has a hands-free and a Spectra pump for home use. She is a , so will have infant separation for 24-72h once she returns to work.     Baby girl appears sleepy with good color and tone. She is latched deeply, feeding with a sustained nutritive pattern with audible swallows. Hannah denies pain with latch, but reports baby has had some painful latches since birth. Hannah demonstrates understanding of how to break a painful latch and re-latch baby more deeply. Baby girl is voiding and stooling appropriate to DOL.     Breastfeeding Assistance:     Discussed elements of attaining a deep latch: place baby tummy to tummy and nipple to nose, wedge breast, and hand express to tease baby to a wide gape and achieve deep latch.      Reviewed signs of adequate milk intake, including adequate voids/stools, transition of stool to yellow/seedy around 5DOL, audible swallowing at breast, milk in mouth on release, progressive relaxation/satiation at breast during feeds, appropriate wt gain. Encouraged to keep peds appointments.    Discussed sore nipple care: Express colostrum to nipple and spread over nipple, allow to air dry. Follow with lanolin cream for  dryness/crustiness. Hannah has silverettes at home and plans to use these.     Discussed ways to wake a sleepy baby.      Provided breastfeeding education on: skin to skin, supply and demand, hunger cues, frequency/duration of breastfeeds, cluster feeding, shallow vs deep latch, and nutritive vs non-nutritive suck.    Hannah brought her own [employer] nipple shields to hospital, inquired about their proper use. Discussed risks and benefits of using shields, as well as application and care of shield.     Discussed timing of introducing pumping/bottle feeding (4 wks of life). Hannah requested a hand pump, so ordered/assembled and instructed her in use.      [facility] Breastfeeding Resources handout provided and outpatient lactation care and support [place] options reviewed.     Encouraged to watch latch and sore nipples videos on [employer] janine.     PLAN:    Skin to skin when either parent awake and alert.    Offer breast whenever hunger cues noted.    If baby sleeps more than 3 hours, wake baby and offer breast.    If baby not waking for feeding at least every 3 hours, hand express and spoon/cup feed back EBM to baby.    Watch latch and sore nipples videos on [employer] janine.

## 2024-07-27 NOTE — CARE PLAN
The patient is Stable - Low risk of patient condition declining or worsening    Shift Goals  Clinical Goals: pain control; rest  Patient Goals: healthy mom and healthy baby  Family Goals: support    Progress made toward(s) clinical / shift goals:  Pain controlled through PRN pain medication administrations and non-pharmacological interventions. Pt slept with minimal interruptions for care.     Patient is not progressing towards the following goals:

## 2024-07-27 NOTE — PROGRESS NOTES
Patient is ready to discharge per MD order. All discharge education and instructions reviewed with patient. AVS reviewed and signed, copy provided to patient. Patient states she has no questions and she feels ready to discharge home with baby.   Infant discharge pending ECHO.

## 2024-07-27 NOTE — DISCHARGE SUMMARY
Discharge Summary:      Hannah Vieira    Admit Date:   2024  Discharge Date:  2024     Admitting diagnosis:  Pregnancy [Z34.90]  Indication for care in labor or delivery [O75.9]  Discharge Diagnosis: Status post vaginal, spontaneous.  Pregnancy Complications: preeclampsia without severe features  Tubal Ligation:  no        History:  History reviewed. No pertinent past medical history.  OB History    Para Term  AB Living   1 1 1     1   SAB IAB Ectopic Molar Multiple Live Births           0 1      # Outcome Date GA Lbr Yves/2nd Weight Sex Delivery Anes PTL Lv   1 Term 24 38w2d / 02:30 3.45 kg (7 lb 9.7 oz) F Vag-Spont EPI, Local N WILEY        Pcn [penicillins]  Patient Active Problem List    Diagnosis Date Noted    Indication for care in labor or delivery 2024        Hospital Course:   34 y.o. , now para 1, was admitted with the above mentioned diagnosis, underwent Induction of Labor, vaginal, spontaneous. Patient postpartum course was unremarkable, with progressive advancement in diet , ambulation and toleration of oral analgesia. Patient without complaints today and desires discharge.      Vitals:    24 1628 24 1747 24 2200 24 0525   BP: 118/71 107/66 111/70 110/68   Pulse:  85 78 68   Resp: 18 16 16 16   Temp: 37 °C (98.6 °F) 36.4 °C (97.5 °F) 37.1 °C (98.7 °F) 36.7 °C (98 °F)   TempSrc: Temporal Temporal Temporal Temporal   SpO2: 95% 95% 95% 98%   Weight:       Height:           Current Facility-Administered Medications   Medication Dose    lactated ringers infusion      docusate sodium (Colace) capsule 100 mg  100 mg    ibuprofen (Motrin) tablet 800 mg  800 mg    acetaminophen (Tylenol) tablet 1,000 mg  1,000 mg    measles, mumps and rubella vaccine (Mmr) injection 0.5 mL  0.5 mL    miSOPROStol (Cytotec) tablet 600 mcg  600 mcg    PRN oxytocin (PITOCIN) (20 Units/1000 mL) PRN for excessive uterine bleeding - See Admin Instr  125-999 mL/hr     oxyCODONE immediate-release (Roxicodone) tablet 5 mg  5 mg    prenatal plus vitamin (Stuartnatal 1+1) 27-1 MG tablet 1 Tablet  1 Tablet    simethicone (Mylicon) chewable tablet 125 mg  125 mg    calcium carbonate (Tums) chewable tab 1,000 mg  1,000 mg    oxytocin (Pitocin) infusion (for post delivery)  125 mL/hr       Exam:  See todays progress note for exam    Labs:  Recent Labs     240 24  0654   WBC 12.2* 12.6*   RBC 4.25 3.37*   HEMOGLOBIN 13.9 11.5*   HEMATOCRIT 39.8 32.9*   MCV 93.6 97.6   MCH 32.7 34.1*   MCHC 34.9 35.0   RDW 43.4 45.9   PLATELETCT 173 132*   MPV 13.3* 13.0*        Activity:   Discharge to home  Pelvic Rest x 6 weeks    Assessment:ppd 1 s/p      Follow up: .6 weeks  Working   Discharge Meds:   Current Outpatient Medications   Medication Sig Dispense Refill    docusate sodium 100 MG Cap Take 100 mg by mouth 2 times a day as needed for Constipation. 60 Capsule 0    ibuprofen (MOTRIN) 800 MG Tab Take 1 Tablet by mouth every 8 hours as needed for Moderate Pain. 30 Tablet 1    oxyCODONE immediate-release (ROXICODONE) 5 MG Tab Take 1 Tablet by mouth every four hours as needed for Severe Pain for up to 5 days. Indications: Acute Pain 12 Tablet 0   Continue pnv and miralax as needed.       Ronel Ramos M.D.

## 2024-08-01 ENCOUNTER — ANESTHESIA (OUTPATIENT)
Dept: OBGYN | Facility: MEDICAL CENTER | Age: 34
End: 2024-08-01
Payer: COMMERCIAL

## 2024-08-01 ENCOUNTER — ANESTHESIA EVENT (OUTPATIENT)
Dept: OBGYN | Facility: MEDICAL CENTER | Age: 34
End: 2024-08-01
Payer: COMMERCIAL

## 2024-08-01 ENCOUNTER — HOSPITAL ENCOUNTER (EMERGENCY)
Facility: MEDICAL CENTER | Age: 34
End: 2024-08-01
Attending: OBSTETRICS & GYNECOLOGY | Admitting: OBSTETRICS & GYNECOLOGY
Payer: COMMERCIAL

## 2024-08-01 VITALS
HEART RATE: 70 BPM | HEIGHT: 66 IN | SYSTOLIC BLOOD PRESSURE: 114 MMHG | WEIGHT: 174 LBS | BODY MASS INDEX: 27.97 KG/M2 | TEMPERATURE: 96.5 F | OXYGEN SATURATION: 96 % | RESPIRATION RATE: 18 BRPM | DIASTOLIC BLOOD PRESSURE: 61 MMHG

## 2024-08-01 PROCEDURE — 700102 HCHG RX REV CODE 250 W/ 637 OVERRIDE(OP): Performed by: ANESTHESIOLOGY

## 2024-08-01 PROCEDURE — 160047 HCHG PACU  - EA ADDL 30 MINS PHASE II: Performed by: OBSTETRICS & GYNECOLOGY

## 2024-08-01 PROCEDURE — 160035 HCHG PACU - 1ST 60 MINS PHASE I: Performed by: OBSTETRICS & GYNECOLOGY

## 2024-08-01 PROCEDURE — 160002 HCHG RECOVERY MINUTES (STAT): Performed by: OBSTETRICS & GYNECOLOGY

## 2024-08-01 PROCEDURE — 160039 HCHG SURGERY MINUTES - EA ADDL 1 MIN LEVEL 3: Performed by: OBSTETRICS & GYNECOLOGY

## 2024-08-01 PROCEDURE — 160025 RECOVERY II MINUTES (STATS): Performed by: OBSTETRICS & GYNECOLOGY

## 2024-08-01 PROCEDURE — 160048 HCHG OR STATISTICAL LEVEL 1-5: Performed by: OBSTETRICS & GYNECOLOGY

## 2024-08-01 PROCEDURE — 160046 HCHG PACU - 1ST 60 MINS PHASE II: Performed by: OBSTETRICS & GYNECOLOGY

## 2024-08-01 PROCEDURE — 700111 HCHG RX REV CODE 636 W/ 250 OVERRIDE (IP): Mod: JZ | Performed by: ANESTHESIOLOGY

## 2024-08-01 PROCEDURE — 160028 HCHG SURGERY MINUTES - 1ST 30 MINS LEVEL 3: Performed by: OBSTETRICS & GYNECOLOGY

## 2024-08-01 PROCEDURE — A9270 NON-COVERED ITEM OR SERVICE: HCPCS | Performed by: ANESTHESIOLOGY

## 2024-08-01 PROCEDURE — 700101 HCHG RX REV CODE 250: Performed by: ANESTHESIOLOGY

## 2024-08-01 PROCEDURE — 700101 HCHG RX REV CODE 250

## 2024-08-01 PROCEDURE — 160036 HCHG PACU - EA ADDL 30 MINS PHASE I: Performed by: OBSTETRICS & GYNECOLOGY

## 2024-08-01 PROCEDURE — 700111 HCHG RX REV CODE 636 W/ 250 OVERRIDE (IP): Mod: JZ

## 2024-08-01 PROCEDURE — 160009 HCHG ANES TIME/MIN: Performed by: OBSTETRICS & GYNECOLOGY

## 2024-08-01 PROCEDURE — 700105 HCHG RX REV CODE 258: Performed by: ANESTHESIOLOGY

## 2024-08-01 PROCEDURE — 302449 STATCHG TRIAGE ONLY (STATISTIC)

## 2024-08-01 RX ORDER — ONDANSETRON 2 MG/ML
4 INJECTION INTRAMUSCULAR; INTRAVENOUS
Status: DISCONTINUED | OUTPATIENT
Start: 2024-08-01 | End: 2024-08-01 | Stop reason: HOSPADM

## 2024-08-01 RX ORDER — LIDOCAINE HYDROCHLORIDE 10 MG/ML
INJECTION, SOLUTION INFILTRATION; PERINEURAL
Status: COMPLETED
Start: 2024-08-01 | End: 2024-08-01

## 2024-08-01 RX ORDER — OXYCODONE HCL 5 MG/5 ML
10 SOLUTION, ORAL ORAL
Status: COMPLETED | OUTPATIENT
Start: 2024-08-01 | End: 2024-08-01

## 2024-08-01 RX ORDER — OXYCODONE HCL 5 MG/5 ML
5 SOLUTION, ORAL ORAL
Status: COMPLETED | OUTPATIENT
Start: 2024-08-01 | End: 2024-08-01

## 2024-08-01 RX ORDER — SODIUM CHLORIDE, SODIUM LACTATE, POTASSIUM CHLORIDE, CALCIUM CHLORIDE 600; 310; 30; 20 MG/100ML; MG/100ML; MG/100ML; MG/100ML
INJECTION, SOLUTION INTRAVENOUS CONTINUOUS
Status: DISCONTINUED | OUTPATIENT
Start: 2024-08-01 | End: 2024-08-01 | Stop reason: HOSPADM

## 2024-08-01 RX ORDER — LIDOCAINE HYDROCHLORIDE 20 MG/ML
INJECTION, SOLUTION EPIDURAL; INFILTRATION; INTRACAUDAL; PERINEURAL PRN
Status: DISCONTINUED | OUTPATIENT
Start: 2024-08-01 | End: 2024-08-01 | Stop reason: SURG

## 2024-08-01 RX ORDER — SODIUM CHLORIDE, SODIUM LACTATE, POTASSIUM CHLORIDE, CALCIUM CHLORIDE 600; 310; 30; 20 MG/100ML; MG/100ML; MG/100ML; MG/100ML
INJECTION, SOLUTION INTRAVENOUS
Status: DISCONTINUED | OUTPATIENT
Start: 2024-08-01 | End: 2024-08-01 | Stop reason: SURG

## 2024-08-01 RX ORDER — KETOROLAC TROMETHAMINE 15 MG/ML
INJECTION, SOLUTION INTRAMUSCULAR; INTRAVENOUS PRN
Status: DISCONTINUED | OUTPATIENT
Start: 2024-08-01 | End: 2024-08-01 | Stop reason: SURG

## 2024-08-01 RX ORDER — SIMETHICONE 125 MG
125 TABLET,CHEWABLE ORAL 3 TIMES DAILY PRN
Status: DISCONTINUED | OUTPATIENT
Start: 2024-08-01 | End: 2024-08-01 | Stop reason: HOSPADM

## 2024-08-01 RX ORDER — CEFAZOLIN SODIUM 1 G/3ML
INJECTION, POWDER, FOR SOLUTION INTRAMUSCULAR; INTRAVENOUS PRN
Status: DISCONTINUED | OUTPATIENT
Start: 2024-08-01 | End: 2024-08-01 | Stop reason: SURG

## 2024-08-01 RX ADMIN — Medication 20 ML: at 16:16

## 2024-08-01 RX ADMIN — KETOROLAC TROMETHAMINE 30 MG: 15 INJECTION, SOLUTION INTRAMUSCULAR; INTRAVENOUS at 15:57

## 2024-08-01 RX ADMIN — FENTANYL CITRATE 50 MCG: 50 INJECTION, SOLUTION INTRAMUSCULAR; INTRAVENOUS at 15:43

## 2024-08-01 RX ADMIN — CEFAZOLIN 2 G: 1 INJECTION, POWDER, FOR SOLUTION INTRAMUSCULAR; INTRAVENOUS at 15:43

## 2024-08-01 RX ADMIN — LIDOCAINE HYDROCHLORIDE 20 ML: 10 INJECTION, SOLUTION INFILTRATION; PERINEURAL at 16:16

## 2024-08-01 RX ADMIN — SODIUM CHLORIDE, POTASSIUM CHLORIDE, SODIUM LACTATE AND CALCIUM CHLORIDE: 600; 310; 30; 20 INJECTION, SOLUTION INTRAVENOUS at 15:38

## 2024-08-01 RX ADMIN — FENTANYL CITRATE: 50 INJECTION, SOLUTION INTRAMUSCULAR; INTRAVENOUS at 16:45

## 2024-08-01 RX ADMIN — PROPOFOL 100 MG: 10 INJECTION, EMULSION INTRAVENOUS at 15:43

## 2024-08-01 RX ADMIN — LIDOCAINE HYDROCHLORIDE 40 MG: 20 INJECTION, SOLUTION EPIDURAL; INFILTRATION; INTRACAUDAL at 15:43

## 2024-08-01 RX ADMIN — PROPOFOL 250 MCG/KG/MIN: 10 INJECTION, EMULSION INTRAVENOUS at 15:45

## 2024-08-01 RX ADMIN — OXYCODONE HYDROCHLORIDE 10 MG: 5 SOLUTION ORAL at 16:58

## 2024-08-01 RX ADMIN — FENTANYL CITRATE 50 MCG: 50 INJECTION, SOLUTION INTRAMUSCULAR; INTRAVENOUS at 16:04

## 2024-08-01 RX ADMIN — FENTANYL CITRATE 25 MCG: 50 INJECTION, SOLUTION INTRAMUSCULAR; INTRAVENOUS at 16:43

## 2024-08-01 ASSESSMENT — PAIN DESCRIPTION - PAIN TYPE
TYPE: ACUTE PAIN

## 2024-08-01 ASSESSMENT — PAIN SCALES - GENERAL: PAIN_LEVEL: 5

## 2024-08-01 ASSESSMENT — FIBROSIS 4 INDEX: FIB4 SCORE: 0.8

## 2024-08-01 NOTE — ANESTHESIA PREPROCEDURE EVALUATION
Case: [id number] Date/Time: 08/01/24 1630    Procedure: REPAIR, OBSTETRIC LACERATION, CURRENT (Vagina )    Pre-op diagnosis: Post delivery repair    Location: [facility] OR 03 / SURGERY LABOR AND DELIVERY    Surgeons: Ronel Ramos M.D.            Relevant Problems   No relevant active problems   Vaginal laceration s/p vaginal delivery    Physical Exam    Airway   Mallampati: II  TM distance: >3 FB  Neck ROM: full       Cardiovascular - normal exam  Rhythm: regular  Rate: normal  (-) murmur     Dental - normal exam           Pulmonary - normal exam  Breath sounds clear to auscultation     Abdominal    Neurological - normal exam                   Anesthesia Plan    ASA 2       Plan - general       Airway plan will be natural airway          Induction: intravenous    Postoperative Plan: Postoperative administration of opioids is intended.    Pertinent diagnostic labs and testing reviewed    Informed Consent:    Anesthetic plan and risks discussed with patient.

## 2024-08-01 NOTE — OR SURGEON
Immediate Post OP Note    PreOp Diagnosis: 2nd degree vaginal laceration breakdown      PostOp Diagnosis: 2nd degree vaginal laceration breakdown      Procedure(s): Debriedment of wound and secondary repair of 2nd degree vaginal laceration.     REPAIR, OBSTETRIC LACERATION, CURRENT - Wound Class: Contaminated    Surgeon(s):  Ronel Ramos M.D.    Anesthesiologist/Type of Anesthesia:  Anesthesiologist: Hay Zayas M.D./Sedation    Surgical Staff:  Circulator: Kenisha Arreguin R.N.  Scrub Person: Marycruz Barnes    Specimens removed if any:  none    Estimated Blood Loss: <20cc    Findings: Areas needing débridement were debrided sharply. The 2nd degree laceration was re-approximated with 2.0 monocryl and 3.0 vicryl  Rectum and sphincter remained intact.     Complications: none    Disposition: to  stable.     8/1/2024 4:37 PM Ronel Ramos M.D.

## 2024-08-01 NOTE — ANESTHESIA TIME REPORT
Anesthesia Start and Stop Event Times       Date Time Event    8/1/2024 1528 Ready for Procedure     1538 Anesthesia Start     1633 Anesthesia Stop          Responsible Staff  08/01/24      Name Role Begin End    Hay Zayas M.D. Anesth 1538 1633          Overtime Reason:  no overtime (within assigned shift)    Comments:

## 2024-08-02 NOTE — ANESTHESIA POSTPROCEDURE EVALUATION
Patient: Hannah Vieira    Procedure Summary       Date: 08/01/24 Room / Location: [facility] OR 03 / SURGERY LABOR AND DELIVERY    Anesthesia Start: 1538 Anesthesia Stop: 1633    Procedure: REPAIR, OBSTETRIC LACERATION, CURRENT (Vagina ) Diagnosis:       Dehiscence of laceration repair      (same, repaired)    Surgeons: Ronel Ramos M.D. Responsible Provider: Hay Zayas M.D.    Anesthesia Type: general ASA Status: 2            Final Anesthesia Type: general  Last vitals  BP   Blood Pressure: 114/61    Temp   35.8 °C (96.5 °F)    Pulse   70   Resp   18    SpO2   96 %      Anesthesia Post Evaluation    Patient location during evaluation: PACU  Patient participation: complete - patient participated  Level of consciousness: awake and alert  Pain score: 5    Airway patency: patent  Anesthetic complications: no  Cardiovascular status: hemodynamically stable  Respiratory status: acceptable  Hydration status: euvolemic    PONV: none          There were no known notable events for this encounter.     Nurse Pain Score: 5 (NPRS)

## 2024-08-02 NOTE — OP REPORT
DATE OF SERVICE:  08/01/2024     PREOPERATIVE DIAGNOSIS:  A second-degree vaginal laceration breakdown.     POSTOPERATIVE DIAGNOSIS:  A second-degree vaginal laceration breakdown.     PROCEDURE:  Debridement of wound and secondary repair of a second-degree   vaginal laceration from obstetrical delivery.     SURGEON:  Ronel Regan MD     ANESTHESIOLOGIST:  Hay Zayas MD     ANESTHESIA:  Sedation with propofol.     ESTIMATED BLOOD LOSS:  Less than 20 mL.     FINDINGS:  Areas of the wound breakdown were debrided sharply and then the   second-degree laceration was reapproximated with 2-0 Monocryl followed by 3-0   Vicryl.  Rectum and sphincter intact.  Sponge, laps and needle counts were   correct.  A 10 mL of lidocaine was infiltrated along the repair line.  She was   taken to recovery room stable.     HOSPITAL COURSE AND DESCRIPTION OF PROCEDURE:  The patient arrived with   breakdown of a second-degree laceration from delivery a few days ago. There   were areas in need of debridement with a yellowish white exudate on top of the   laceration.  She was taken to the operating room where Dr. Zayas placed her   under IV sedation with propofol.  She was prepped and draped in a normal   sterile fashion in dorsal lithotomy position.  Timeout was called to identify   correct patient, correct procedure and she did get a dose of Ancef   prophylactically.  The wound was examined and some of the old sutures were   removed.  The areas with contained yellowish white exudate over the top of the   superficial tissues were debrided sharply with a knife until there was   healthy tissue visible.  Second-degree laceration was then repaired with 2-0   Monocryl followed by 3-0 Vicryl.  Rectum and sphincter remained intact.    Sponge, laps and needle counts were correct.  The patient went to recovery   room stable.        ______________________________  RONEL REGAN MD    SDP/BIN    DD:  08/01/2024 16:43  DT:  08/01/2024  17:00    Job#:  [id number]

## 2024-08-02 NOTE — PROGRESS NOTES
1538- Pt in OR for debridement of wound and secondary repair of a second-degree laceration from vaginal delivery 7/26 by Dr Ramos.    1629- Pt in PACU. Pt c/o pain at perineum. Pt repositioned onto side and ice packs applied.     1643- IV Fentanyl 25 mcg given for 10/10 pain at perineum.     1655- Pads changed, dermablast spray applied with tucks pads and ice. Pt states it is feeling better with these interventions.     1658- PO Beryl oral solution 10 mg given per pt request. FOB brought to bedside with new baby.     1715- Dr Ramos made aware of pt BP of 165/94 and pt pain level. She is ok with this reading at this time. Orders received to monitor closely.     1720- Pt is rating pain now 7/10 and feeling better.     1730- Pt BP trending down at 148/84. Pain is decreasing.     1800- Dr Ramos at bedside to check in on pt.     1845- Pt assisted up to BR and voided successfully. Pads changed, dermaplast and tucks pads applied with new underwear.     1900- Report given to ELIDIA Kruse.

## 2024-08-02 NOTE — H&P
DATE OF ADMISSION:  08/01/2024     HISTORY OF PRESENT ILLNESS:  The patient is a 34-year-old G1 now P1 who is 6   days postpartum from a vaginal delivery.  She had a second-degree laceration   and a small perineal hematoma and she went into the office today to have it   evaluated and the second-degree laceration had come apart completely.  Her   hematoma had completely resolved, however, it appeared that the tissue needed   debriding. She was in significant pain upon examination in the office per Dr. Crandall and was not able to tolerate doing any debridement in the office, so   the patient was sent to labor and delivery to prepare her to go to the   operating room to have it done there.  The patient denies any fever, chills,   nausea, vomiting.  Her pain has been in fair amount of control, but of course   having increasing discharge with this.  She has been doing Sitz baths as she   was instructed to do secondary to her lacerations.     PAST MEDICAL HISTORY:  Noncontributory.     ALLERGIES:  PENICILLIN AND VICODIN.     SOCIAL HISTORY:  She is a , .  Her 's name is   Clint.  She denies any alcohol, tobacco or drug use.     PAST SURGICAL HISTORY:  Includes tonsillectomy, wisdom teeth removal.     FAMILY HISTORY:  Includes stroke.     OBSTETRICAL HISTORY:  G1 again now P1, status post vaginal delivery 6 days   ago.     GYNECOLOGIC HISTORY:  Last menstrual period 11/01/2023, history of abnormals,   but her last colposcopy while she was pregnant was still HPV high risk   positive and TRAV 3.  She will need postpartum follow up for this.     CURRENT MEDICATIONS:  Include Tylenol, ibuprofen and prenatal vitamins.     PHYSICAL EXAMINATION:   VITAL SIGNS:  She has 126/75 blood pressure, pulse is in the 50s-60s and she   is afebrile.  GENERAL:  She is awake, alert, oriented.  She is in no acute distress.  CARDIOVASCULAR:  Regular rate and rhythm.  CHEST:  Clear to auscultation bilaterally.  ABDOMEN:   Soft, nontender, nondistended, normal bowel sounds.  EXTREMITIES:  No cyanosis, clubbing or edema.  PELVIC:  Vaginal exam, her second-degree laceration has opened up, and Dr. Crandall took out some of the sutures in the office, but was not able to do the   debridement.  There are some areas that do need debriding with some yellowish   whitish exudate on top.  There does not appear to be any signs of infection.    It is a fairly clean opening.  It does not involve her rectum or sphincter nor   most of her perineum.     LABORATORY DATA:  None were drawn today, but her postpartum hemoglobin was   11.5, hematocrit 32.6 and platelet count was 132,000.     ASSESSMENT AND PLAN:  A 34-year-old G1 now P1 status post vaginal delivery on   07/26/2024, presents with a breakdown of her second-degree vaginal laceration   obstetric laceration.  1.  The area does need debridement.  The patient is not comfortable doing it   under local anesthesia or lidocaine and would like to be taken to the   operating room to be more comfortable.  We will plan for debridement and will   reapproximate/re-repair it, however, the patient has been fairly warned that   the likelihood of this repair holding up is very unlikely. It will likely   break down again as the tissues are not fresh tissues and may need to heal by   secondary intent.  She is aware of this and still wants me to try to put it   back together and not just do the debridement alone.  Risks, benefits and   alternatives have been thoroughly discussed with her and her  at the   bedside.  All questions were answered prior to proceeding to the operating   room.        ______________________________  MD MARY BOSSP/RUB    DD:  08/01/2024 15:33  DT:  08/01/2024 16:19    Job#:  [id number]

## 2024-08-02 NOTE — PROGRESS NOTES
1900 bedside report received from Kenisha RN, care assumed    2012 BP's reviewed with Dr Ramos. D/C orders received     2025 d/c paperwork reviewed with patient, all understanding verbalized. Pt off unit in stable condition

## 2024-08-02 NOTE — DISCHARGE INSTRUCTIONS

## 2024-10-04 ENCOUNTER — OFFICE VISIT (OUTPATIENT)
Dept: URGENT CARE | Facility: CLINIC | Age: 34
End: 2024-10-04
Payer: COMMERCIAL

## 2024-10-04 VITALS
RESPIRATION RATE: 18 BRPM | BODY MASS INDEX: 25.71 KG/M2 | HEIGHT: 66 IN | DIASTOLIC BLOOD PRESSURE: 72 MMHG | HEART RATE: 72 BPM | OXYGEN SATURATION: 94 % | WEIGHT: 160 LBS | TEMPERATURE: 96.8 F | SYSTOLIC BLOOD PRESSURE: 108 MMHG

## 2024-10-04 DIAGNOSIS — Z20.822 EXPOSURE TO COVID-19 VIRUS: Primary | ICD-10-CM

## 2024-10-04 LAB
FLUAV RNA SPEC QL NAA+PROBE: NEGATIVE
FLUBV RNA SPEC QL NAA+PROBE: NEGATIVE
RSV RNA SPEC QL NAA+PROBE: NEGATIVE
SARS-COV-2 RNA RESP QL NAA+PROBE: NEGATIVE

## 2024-10-04 PROCEDURE — 0241U POCT CEPHEID COV-2, FLU A/B, RSV - PCR: CPT

## 2024-10-04 PROCEDURE — 3078F DIAST BP <80 MM HG: CPT

## 2024-10-04 PROCEDURE — 99212 OFFICE O/P EST SF 10 MIN: CPT

## 2024-10-04 PROCEDURE — 3074F SYST BP LT 130 MM HG: CPT

## 2024-10-04 ASSESSMENT — ENCOUNTER SYMPTOMS
MYALGIAS: 0
SPUTUM PRODUCTION: 0
NAUSEA: 0
SORE THROAT: 0
DIZZINESS: 0
EYE REDNESS: 0
VOMITING: 0
WHEEZING: 0
FEVER: 0
SHORTNESS OF BREATH: 0
COUGH: 0
STRIDOR: 0
EYE PAIN: 0
ABDOMINAL PAIN: 0
EYE DISCHARGE: 0
DIARRHEA: 0
PALPITATIONS: 0
HEADACHES: 0
CHILLS: 0

## 2024-10-04 ASSESSMENT — FIBROSIS 4 INDEX: FIB4 SCORE: 0.8

## 2024-10-22 ENCOUNTER — OFFICE VISIT (OUTPATIENT)
Dept: URGENT CARE | Facility: CLINIC | Age: 34
End: 2024-10-22
Payer: COMMERCIAL

## 2024-10-22 VITALS
HEART RATE: 80 BPM | OXYGEN SATURATION: 95 % | SYSTOLIC BLOOD PRESSURE: 114 MMHG | BODY MASS INDEX: 25.88 KG/M2 | WEIGHT: 161 LBS | DIASTOLIC BLOOD PRESSURE: 78 MMHG | RESPIRATION RATE: 14 BRPM | HEIGHT: 66 IN | TEMPERATURE: 97.4 F

## 2024-10-22 DIAGNOSIS — R05.8 DRY COUGH: ICD-10-CM

## 2024-10-22 DIAGNOSIS — J02.9 SORE THROAT: ICD-10-CM

## 2024-10-22 LAB — S PYO DNA SPEC NAA+PROBE: NOT DETECTED

## 2024-10-22 PROCEDURE — 3074F SYST BP LT 130 MM HG: CPT | Performed by: STUDENT IN AN ORGANIZED HEALTH CARE EDUCATION/TRAINING PROGRAM

## 2024-10-22 PROCEDURE — 87651 STREP A DNA AMP PROBE: CPT | Performed by: STUDENT IN AN ORGANIZED HEALTH CARE EDUCATION/TRAINING PROGRAM

## 2024-10-22 PROCEDURE — 99213 OFFICE O/P EST LOW 20 MIN: CPT | Performed by: STUDENT IN AN ORGANIZED HEALTH CARE EDUCATION/TRAINING PROGRAM

## 2024-10-22 PROCEDURE — 3078F DIAST BP <80 MM HG: CPT | Performed by: STUDENT IN AN ORGANIZED HEALTH CARE EDUCATION/TRAINING PROGRAM

## 2024-10-22 ASSESSMENT — ENCOUNTER SYMPTOMS
COUGH: 1
FEVER: 0
SORE THROAT: 1
CHILLS: 0
PALPITATIONS: 0
SHORTNESS OF BREATH: 0
WHEEZING: 0

## 2024-10-22 ASSESSMENT — FIBROSIS 4 INDEX: FIB4 SCORE: 0.8

## 2024-10-23 ENCOUNTER — APPOINTMENT (OUTPATIENT)
Dept: URGENT CARE | Facility: CLINIC | Age: 34
End: 2024-10-23
Payer: COMMERCIAL

## 2024-10-24 ENCOUNTER — OFFICE VISIT (OUTPATIENT)
Dept: URGENT CARE | Facility: CLINIC | Age: 34
End: 2024-10-24
Payer: COMMERCIAL

## 2024-10-24 VITALS
HEIGHT: 66 IN | TEMPERATURE: 98.3 F | SYSTOLIC BLOOD PRESSURE: 114 MMHG | HEART RATE: 91 BPM | DIASTOLIC BLOOD PRESSURE: 68 MMHG | OXYGEN SATURATION: 93 % | BODY MASS INDEX: 25.68 KG/M2 | WEIGHT: 159.8 LBS | RESPIRATION RATE: 18 BRPM

## 2024-10-24 DIAGNOSIS — U07.1 COVID-19: ICD-10-CM

## 2024-10-24 DIAGNOSIS — H66.92 ACUTE LEFT OTITIS MEDIA: ICD-10-CM

## 2024-10-24 PROCEDURE — 99213 OFFICE O/P EST LOW 20 MIN: CPT | Performed by: STUDENT IN AN ORGANIZED HEALTH CARE EDUCATION/TRAINING PROGRAM

## 2024-10-24 PROCEDURE — 3078F DIAST BP <80 MM HG: CPT | Performed by: STUDENT IN AN ORGANIZED HEALTH CARE EDUCATION/TRAINING PROGRAM

## 2024-10-24 PROCEDURE — 3074F SYST BP LT 130 MM HG: CPT | Performed by: STUDENT IN AN ORGANIZED HEALTH CARE EDUCATION/TRAINING PROGRAM

## 2024-10-24 RX ORDER — CEFDINIR 300 MG/1
300 CAPSULE ORAL 2 TIMES DAILY
Qty: 14 CAPSULE | Refills: 0 | Status: SHIPPED | OUTPATIENT
Start: 2024-10-24 | End: 2024-10-31

## 2024-10-24 RX ORDER — BENZONATATE 100 MG/1
200 CAPSULE ORAL 3 TIMES DAILY PRN
Qty: 60 CAPSULE | Refills: 0 | Status: SHIPPED | OUTPATIENT
Start: 2024-10-24

## 2024-10-24 ASSESSMENT — FIBROSIS 4 INDEX: FIB4 SCORE: 0.8

## 2025-04-20 ENCOUNTER — OFFICE VISIT (OUTPATIENT)
Dept: URGENT CARE | Facility: PHYSICIAN GROUP | Age: 35
End: 2025-04-20
Payer: COMMERCIAL

## 2025-04-20 VITALS
HEIGHT: 66 IN | WEIGHT: 163.14 LBS | SYSTOLIC BLOOD PRESSURE: 126 MMHG | OXYGEN SATURATION: 98 % | TEMPERATURE: 97.9 F | BODY MASS INDEX: 26.22 KG/M2 | RESPIRATION RATE: 18 BRPM | DIASTOLIC BLOOD PRESSURE: 68 MMHG | HEART RATE: 74 BPM

## 2025-04-20 DIAGNOSIS — B02.9 HERPES ZOSTER WITHOUT COMPLICATION: ICD-10-CM

## 2025-04-20 PROCEDURE — 99214 OFFICE O/P EST MOD 30 MIN: CPT | Performed by: STUDENT IN AN ORGANIZED HEALTH CARE EDUCATION/TRAINING PROGRAM

## 2025-04-20 PROCEDURE — 3078F DIAST BP <80 MM HG: CPT | Performed by: STUDENT IN AN ORGANIZED HEALTH CARE EDUCATION/TRAINING PROGRAM

## 2025-04-20 PROCEDURE — 3074F SYST BP LT 130 MM HG: CPT | Performed by: STUDENT IN AN ORGANIZED HEALTH CARE EDUCATION/TRAINING PROGRAM

## 2025-04-20 RX ORDER — VALACYCLOVIR HYDROCHLORIDE 1 G/1
1000 TABLET, FILM COATED ORAL 3 TIMES DAILY
Qty: 21 TABLET | Refills: 0 | Status: SHIPPED | OUTPATIENT
Start: 2025-04-20 | End: 2025-04-27

## 2025-04-20 ASSESSMENT — FIBROSIS 4 INDEX: FIB4 SCORE: 0.83

## 2025-04-21 NOTE — PROGRESS NOTES
Subjective:   CHIEF COMPLAINT  Chief Complaint   Patient presents with    Herpes Zoster     X today. Flare-up. Spreading on (L) cheek       HPI    History of Present Illness  Hannah Vieira is a 35 y.o. female who presents for evaluation of a shingles outbreak.    The chief complaint is an annual occurrence of shingles, typically manifesting as a blister rash extending approximately half an inch from the left angle of her mouth towards her cheek.  Symptoms started earlier today.  The current episode began with pain and skin sensitivity, which has since started to spread outward. No medication has been taken for the symptoms today. She is no longer breastfeeding and confirms that she is not pregnant. No systemic symptoms such as fevers, chills, or body aches are reported. A history of chickenpox in childhood is noted. Her partner experiences cold sores, but she has never contracted them concurrently. The frequency of shingles outbreaks has increased since starting her current job as a , which often results in sleep deprivation. A correlation between these outbreaks and periods of high stress or lack of sleep is observed.    SOCIAL HISTORY  She works as a .    FAMILY HISTORY  Her mother gets shingles.        REVIEW OF SYSTEMS  General: no fever or chills  GI: no nausea or vomiting  See HPI for further details.    PAST MEDICAL HISTORY  Patient Active Problem List    Diagnosis Date Noted    Indication for care in labor or delivery 07/25/2024       SURGICAL HISTORY   has a past surgical history that includes dental extraction(s); tonsillectomy; and episiotomy/vaginal repr by tim gallardo (N/A, 8/1/2024).    ALLERGIES  Allergies   Allergen Reactions    Pcn [Penicillins] Rash       CURRENT MEDICATIONS  benzonatate Caps  docusate sodium Caps  ibuprofen Tabs  Lidocaine Gel  valacyclovir Tabs    SOCIAL HISTORY  Social History     Tobacco Use    Smoking status: Never    Smokeless tobacco: Never   Vaping  "Use    Vaping status: Never Used   Substance and Sexual Activity    Alcohol use: Not Currently    Drug use: Not Currently    Sexual activity: Not on file       FAMILY HISTORY  No family history on file.       Objective:   PHYSICAL EXAM  VITAL SIGNS: /68 (BP Location: Left arm, Patient Position: Sitting, BP Cuff Size: Adult long)   Pulse 74   Temp 36.6 °C (97.9 °F) (Temporal)   Resp 18   Ht 1.676 m (5' 6\")   Wt 74 kg (163 lb 2.3 oz)   SpO2 98%   BMI 26.33 kg/m²     Gen: no acute distress, normal voice  Skin: dry, intact, moist mucosal membranes.  Cluster of erythematous papules vs early vesicles along the angle left side of mouth.  Eyes: No conjunctival injection bilaterally.  Neck: Normal range of motion. No meningeal signs.   Lungs: No increased work of breathing.  CTAB w/ symmetric expansion  CV: RRR w/o murmurs or clicks  Psych: normal affect, normal judgement, alert, awake    Assessment/Plan:     1. 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  -Ordered shingles treatment Valtrex  -Ordered topical lidocaine gel  -Return to urgent care any new/worsening symptoms or further questions or concerns.  Patient understood everything discussed.  All questions were answered.      Chronic condition with acute exacerbation, ordered prescription medication    Please note that this dictation was created using voice recognition software. I have made a reasonable attempt to correct obvious errors, but I expect that there are errors of grammar and possibly content that I did not discover before finalizing the note.         "

## (undated) DEVICE — CANNULA O2 COMFORT SOFT EAR ADULT 7 FT TUBING (50/CA)

## (undated) DEVICE — SOLUTION PLASMA-LYTE PH 7.4 INJ 1000ML (14EA/CA)

## (undated) DEVICE — TUBE CONNECTING SUCTION - CLEAR PLASTIC STERILE 72 IN (50EA/CA)

## (undated) DEVICE — GLOVE BIOGEL INDICATOR SZ 6 SURGICAL PF LTX -(50/BX)

## (undated) DEVICE — BLANKET WARMING UPPER BODY - (10/CA)

## (undated) DEVICE — PAD SANITARY 11IN MAXI IND WRAPPED (12EA/PK 24PK/CA)

## (undated) DEVICE — SPONGE XRAY 8X4 STERL. 12PL - (10EA/TY 80TY/CA)

## (undated) DEVICE — Device

## (undated) DEVICE — CATHETER IV NON-SAFETY 18 GA X 1 1/4 (50/BX 4BX/CA)

## (undated) DEVICE — MAT PATIENT POSITIONING PREVALON (10EA/CA)

## (undated) DEVICE — TRAY SRGPRP PVP IOD WT PRP - (20/CA)

## (undated) DEVICE — SET EXTENSION WITH 2 PORTS (48EA/CA) ***PART #2C8610 IS A SUBSTITUTE*****

## (undated) DEVICE — SUTURE 2-0 MONOCRYL CT-1

## (undated) DEVICE — WATER IRRIGATION STERILE 1000ML (12EA/CA)

## (undated) DEVICE — SUTURE 2-0 VICRYL PLUS CT-1 36 (36PK/BX)"

## (undated) DEVICE — PACKING VAG 2 X-RAY (24EA/CS)

## (undated) DEVICE — PACK VAGINAL FOR L&D (4EA/CA)

## (undated) DEVICE — GLOVE BIOGEL INDICATOR SZ 6.5 SURGICAL PF LTX - (50PR/BX 4BX/CA)

## (undated) DEVICE — TUBING SCT 18IN BR SFTCH BTL - 10/BX GOES WITH 77350

## (undated) DEVICE — PACK ROOM TURNOVER L&D (12/CA)

## (undated) DEVICE — KIT  I.V. START (100EA/CA)

## (undated) DEVICE — TUBING CLEARLINK DUO-VENT - C-FLO (48EA/CA)

## (undated) DEVICE — HEAD HOLDER JUNIOR/ADULT

## (undated) DEVICE — SUCTION INSTRUMENT YANKAUER BULBOUS TIP W/O VENT (50EA/CA)